# Patient Record
Sex: MALE | URBAN - METROPOLITAN AREA
[De-identification: names, ages, dates, MRNs, and addresses within clinical notes are randomized per-mention and may not be internally consistent; named-entity substitution may affect disease eponyms.]

---

## 2023-08-21 ENCOUNTER — TELEPHONE ENCOUNTER (OUTPATIENT)
Dept: URBAN - METROPOLITAN AREA CLINIC 96 | Facility: CLINIC | Age: 58
End: 2023-08-21

## 2023-08-21 ENCOUNTER — OFFICE VISIT (OUTPATIENT)
Dept: URBAN - METROPOLITAN AREA TELEHEALTH 2 | Facility: TELEHEALTH | Age: 58
End: 2023-08-21
Payer: OTHER GOVERNMENT

## 2023-08-21 VITALS — HEIGHT: 71 IN | BODY MASS INDEX: 25.9 KG/M2 | WEIGHT: 185 LBS

## 2023-08-21 DIAGNOSIS — R10.13 EPIGASTRIC PAIN: ICD-10-CM

## 2023-08-21 DIAGNOSIS — Z86.010 HISTORY OF COLONIC POLYPS: ICD-10-CM

## 2023-08-21 PROBLEM — 428283002: Status: ACTIVE | Noted: 2023-08-21

## 2023-08-21 PROBLEM — 473066000: Status: ACTIVE | Noted: 2023-08-21

## 2023-08-21 PROCEDURE — 99204 OFFICE O/P NEW MOD 45 MIN: CPT | Performed by: INTERNAL MEDICINE

## 2023-08-21 PROCEDURE — 99244 OFF/OP CNSLTJ NEW/EST MOD 40: CPT | Performed by: INTERNAL MEDICINE

## 2023-08-21 NOTE — HPI-TODAY'S VISIT:
referred by Loyda Razo for hx of colon polyps copy of the note sent to referring MD is a  [facility]    medications on omeprazole GASX has testicular cancer in 1998 has had colonoscopies in the past, maybe 5 in total. hx of polyps in past no LGI sx currently last one > 1 year ago denies constipation struggles w gurgling/rumbling in his stomach  medications: metformin/glyburide, lisinopril, gabapentin prn, omeprazole, gasX  allergies: IV dye

## 2023-08-24 ENCOUNTER — OFFICE VISIT (OUTPATIENT)
Dept: URBAN - METROPOLITAN AREA CLINIC 74 | Facility: CLINIC | Age: 58
End: 2023-08-24

## 2023-08-24 RX ORDER — ERGOCALCIFEROL 1.25 MG/1
1 CAPSULE CAPSULE ORAL
Status: ACTIVE | COMMUNITY

## 2023-08-24 RX ORDER — DULOXETINE 60 MG/1
1 CAPSULE CAPSULE, DELAYED RELEASE PELLETS ORAL ONCE A DAY
Status: ACTIVE | COMMUNITY

## 2023-08-24 RX ORDER — SIMETHICONE 80 MG/1
1 TABLET AFTER MEALS AND AT BEDTIME AS NEEDED TABLET, CHEWABLE ORAL
Status: ACTIVE | COMMUNITY

## 2023-08-24 RX ORDER — OMEPRAZOLE 40 MG/1
1 CAPSULE 30 MINUTES BEFORE MORNING MEAL CAPSULE, DELAYED RELEASE ORAL ONCE A DAY
Status: ACTIVE | COMMUNITY

## 2023-08-24 RX ORDER — LISINOPRIL 10 MG/1
1 TABLET TABLET ORAL ONCE A DAY
Status: ACTIVE | COMMUNITY

## 2023-08-24 RX ORDER — GABAPENTIN 300 MG/1
1 CAPSULE CAPSULE ORAL ONCE A DAY
Status: ACTIVE | COMMUNITY

## 2023-09-28 ENCOUNTER — TELEPHONE ENCOUNTER (OUTPATIENT)
Dept: URBAN - METROPOLITAN AREA CLINIC 96 | Facility: CLINIC | Age: 58
End: 2023-09-28

## 2023-10-05 ENCOUNTER — CLAIMS CREATED FROM THE CLAIM WINDOW (OUTPATIENT)
Dept: URBAN - METROPOLITAN AREA SURGERY CENTER 19 | Facility: SURGERY CENTER | Age: 58
End: 2023-10-05
Payer: OTHER GOVERNMENT

## 2023-10-05 ENCOUNTER — CLAIMS CREATED FROM THE CLAIM WINDOW (OUTPATIENT)
Dept: URBAN - METROPOLITAN AREA SURGERY CENTER 19 | Facility: SURGERY CENTER | Age: 58
End: 2023-10-05

## 2023-10-05 ENCOUNTER — CLAIMS CREATED FROM THE CLAIM WINDOW (OUTPATIENT)
Dept: URBAN - METROPOLITAN AREA CLINIC 4 | Facility: CLINIC | Age: 58
End: 2023-10-05
Payer: OTHER GOVERNMENT

## 2023-10-05 DIAGNOSIS — Z12.11 COLON CANCER SCREENING (HIGH RISK): ICD-10-CM

## 2023-10-05 DIAGNOSIS — Z12.11 COLON CANCER SCREENING: ICD-10-CM

## 2023-10-05 DIAGNOSIS — K31.89 MUCOSAL ABNORMALITY OF STOMACH: ICD-10-CM

## 2023-10-05 DIAGNOSIS — R12 BURNING REFLUX: ICD-10-CM

## 2023-10-05 DIAGNOSIS — K31.7 BENIGN GASTRIC POLYP: ICD-10-CM

## 2023-10-05 DIAGNOSIS — D12.5 ADENOMATOUS POLYP OF SIGMOID COLON: ICD-10-CM

## 2023-10-05 DIAGNOSIS — K31.89 OTHER DISEASES OF STOMACH AND DUODENUM: ICD-10-CM

## 2023-10-05 DIAGNOSIS — K63.5 BENIGN COLON POLYP: ICD-10-CM

## 2023-10-05 DIAGNOSIS — K31.7 GASTRIC POLYPS: ICD-10-CM

## 2023-10-05 DIAGNOSIS — T47.8X5A ADVERSE EFFECT OF OTHER AGENTS PRIMARILY AFFECTING GASTROINTESTINAL SYSTEM, INITIAL ENCOUNTER: ICD-10-CM

## 2023-10-05 PROCEDURE — 43239 EGD BIOPSY SINGLE/MULTIPLE: CPT | Performed by: INTERNAL MEDICINE

## 2023-10-05 PROCEDURE — 45380 COLONOSCOPY AND BIOPSY: CPT | Performed by: INTERNAL MEDICINE

## 2023-10-05 PROCEDURE — 88312 SPECIAL STAINS GROUP 1: CPT | Performed by: PATHOLOGY

## 2023-10-05 PROCEDURE — 00813 ANES UPR LWR GI NDSC PX: CPT | Performed by: NURSE ANESTHETIST, CERTIFIED REGISTERED

## 2023-10-05 PROCEDURE — G8907 PT DOC NO EVENTS ON DISCHARG: HCPCS | Performed by: INTERNAL MEDICINE

## 2023-10-05 PROCEDURE — 88305 TISSUE EXAM BY PATHOLOGIST: CPT | Performed by: PATHOLOGY

## 2023-10-05 RX ORDER — SIMETHICONE 80 MG/1
1 TABLET AFTER MEALS AND AT BEDTIME AS NEEDED TABLET, CHEWABLE ORAL
Status: ACTIVE | COMMUNITY

## 2023-10-05 RX ORDER — GABAPENTIN 300 MG/1
1 CAPSULE CAPSULE ORAL ONCE A DAY
Status: ACTIVE | COMMUNITY

## 2023-10-05 RX ORDER — ERGOCALCIFEROL 1.25 MG/1
1 CAPSULE CAPSULE ORAL
Status: ACTIVE | COMMUNITY

## 2023-10-05 RX ORDER — DULOXETINE 60 MG/1
1 CAPSULE CAPSULE, DELAYED RELEASE PELLETS ORAL ONCE A DAY
Status: ACTIVE | COMMUNITY

## 2023-10-05 RX ORDER — LISINOPRIL 10 MG/1
1 TABLET TABLET ORAL ONCE A DAY
Status: ACTIVE | COMMUNITY

## 2023-10-05 RX ORDER — OMEPRAZOLE 40 MG/1
1 CAPSULE 30 MINUTES BEFORE MORNING MEAL CAPSULE, DELAYED RELEASE ORAL ONCE A DAY
Status: ACTIVE | COMMUNITY